# Patient Record
Sex: MALE | Race: WHITE | NOT HISPANIC OR LATINO | URBAN - METROPOLITAN AREA
[De-identification: names, ages, dates, MRNs, and addresses within clinical notes are randomized per-mention and may not be internally consistent; named-entity substitution may affect disease eponyms.]

---

## 2020-09-18 NOTE — PATIENT DISCUSSION
RECOMMEND TRANSCONJUNCTIVAL LOWER EYELID BLEPHAROPLASTY W/ CO2 LASER RESURFACING AND RESUSPENSION OF LATERAL ANGLES, OU

## 2020-09-18 NOTE — PATIENT DISCUSSION
PHOTOGRAPHS: I have reviewed the external ocular photographs of this patient which show the following: moderate dermatochalasis of both lower eyelids.

## 2022-07-11 ENCOUNTER — NEW PATIENT (OUTPATIENT)
Dept: URBAN - METROPOLITAN AREA CLINIC 49 | Facility: CLINIC | Age: 77
End: 2022-07-11

## 2022-07-11 DIAGNOSIS — H35.371: ICD-10-CM

## 2022-07-11 DIAGNOSIS — H25.13: ICD-10-CM

## 2022-07-11 DIAGNOSIS — E11.9: ICD-10-CM

## 2022-07-11 DIAGNOSIS — H35.363: ICD-10-CM

## 2022-07-11 DIAGNOSIS — H04.123: ICD-10-CM

## 2022-07-11 PROCEDURE — 92004 COMPRE OPH EXAM NEW PT 1/>: CPT

## 2022-07-11 PROCEDURE — 92015 DETERMINE REFRACTIVE STATE: CPT

## 2022-07-11 PROCEDURE — 92134 CPTRZ OPH DX IMG PST SGM RTA: CPT

## 2022-07-11 ASSESSMENT — VISUAL ACUITY
OU_CC: J2@23"
OS_PH: 20/40
OS_CC: 20/80+2
OD_CC: 20/30+2
OD_GLARE: 20/70
OD_GLARE: 20/50
OU_CC: J3@16"

## 2022-07-11 ASSESSMENT — TONOMETRY
OD_IOP_MMHG: 12
OS_IOP_MMHG: 13

## 2022-07-29 ENCOUNTER — PRE-OP/H&P (OUTPATIENT)
Dept: URBAN - METROPOLITAN AREA CLINIC 49 | Facility: CLINIC | Age: 77
End: 2022-07-29

## 2022-07-29 DIAGNOSIS — H25.13: ICD-10-CM

## 2022-07-29 PROCEDURE — 92136 OPHTHALMIC BIOMETRY: CPT

## 2022-07-29 PROCEDURE — 92025-3 CORNEAL TOPO, REFUSED

## 2022-07-29 PROCEDURE — PREOP PRE OP VISIT

## 2022-07-29 ASSESSMENT — KERATOMETRY
OD_K2POWER_DIOPTERS: 44.12
OS_K2POWER_DIOPTERS: 44.00
OD_AXISANGLE2_DEGREES: 176
OS_AXISANGLE_DEGREES: 045
OD_AXISANGLE_DEGREES: 086
OS_AXISANGLE2_DEGREES: 135
OS_K1POWER_DIOPTERS: 44.75
OD_K1POWER_DIOPTERS: 45.12

## 2022-07-29 ASSESSMENT — TONOMETRY
OD_IOP_MMHG: 14
OS_IOP_MMHG: 14

## 2022-07-29 ASSESSMENT — VISUAL ACUITY
OD_CC: 20/40-1
OS_PH: 20/50-2
OS_CC: 20/200

## 2022-08-10 ENCOUNTER — SURGERY/PROCEDURE (OUTPATIENT)
Dept: URBAN - METROPOLITAN AREA SURGERY 16 | Facility: SURGERY | Age: 77
End: 2022-08-10

## 2022-08-10 DIAGNOSIS — H25.12: ICD-10-CM

## 2022-08-10 PROBLEM — Z96.1: Noted: 2022-08-10

## 2022-08-10 PROCEDURE — 66984 XCAPSL CTRC RMVL W/O ECP: CPT

## 2022-08-10 ASSESSMENT — KERATOMETRY
OD_K1POWER_DIOPTERS: 45.12
OS_K1POWER_DIOPTERS: 44.75
OD_K2POWER_DIOPTERS: 44.12
OS_AXISANGLE_DEGREES: 045
OS_K2POWER_DIOPTERS: 44.00
OS_AXISANGLE2_DEGREES: 135
OD_AXISANGLE_DEGREES: 086
OD_AXISANGLE2_DEGREES: 176

## 2022-08-11 ENCOUNTER — POST-OP (OUTPATIENT)
Dept: URBAN - METROPOLITAN AREA CLINIC 49 | Facility: CLINIC | Age: 77
End: 2022-08-11

## 2022-08-11 DIAGNOSIS — Z98.42: ICD-10-CM

## 2022-08-11 DIAGNOSIS — Z96.1: ICD-10-CM

## 2022-08-11 ASSESSMENT — KERATOMETRY
OD_K1POWER_DIOPTERS: 45.12
OS_AXISANGLE_DEGREES: 045
OS_AXISANGLE2_DEGREES: 135
OD_AXISANGLE2_DEGREES: 176
OS_K2POWER_DIOPTERS: 44.00
OS_K1POWER_DIOPTERS: 44.75
OD_AXISANGLE_DEGREES: 086
OD_K2POWER_DIOPTERS: 44.12

## 2022-08-11 ASSESSMENT — VISUAL ACUITY: OS_SC: 20/80

## 2022-08-11 ASSESSMENT — TONOMETRY: OS_IOP_MMHG: 18

## 2022-08-19 ENCOUNTER — POST OP/EVAL OF SECOND EYE (OUTPATIENT)
Dept: URBAN - METROPOLITAN AREA CLINIC 49 | Facility: CLINIC | Age: 77
End: 2022-08-19

## 2022-08-19 DIAGNOSIS — Z98.42: ICD-10-CM

## 2022-08-19 DIAGNOSIS — H25.11: ICD-10-CM

## 2022-08-19 PROCEDURE — 99213 OFFICE O/P EST LOW 20 MIN: CPT

## 2022-08-19 PROCEDURE — 92136 - 2N OPHTHALMIC BIOMETRY BY PARTIAL COHERENCE INTERFEROMETRY WITH INTRAOCULAR LENS POWER CALCULATION

## 2022-08-19 ASSESSMENT — KERATOMETRY
OD_K2POWER_DIOPTERS: 44.12
OS_AXISANGLE_DEGREES: 045
OD_AXISANGLE2_DEGREES: 176
OD_AXISANGLE_DEGREES: 086
OS_AXISANGLE2_DEGREES: 135
OS_K2POWER_DIOPTERS: 44.00
OD_K1POWER_DIOPTERS: 45.12
OS_K1POWER_DIOPTERS: 44.75

## 2022-08-19 ASSESSMENT — TONOMETRY
OS_IOP_MMHG: 14
OD_IOP_MMHG: 14

## 2022-08-19 ASSESSMENT — VISUAL ACUITY
OS_SC: 20/50-1
OD_CC: 20/30

## 2022-08-31 ENCOUNTER — SURGERY/PROCEDURE (OUTPATIENT)
Dept: URBAN - METROPOLITAN AREA SURGERY 16 | Facility: SURGERY | Age: 77
End: 2022-08-31

## 2022-08-31 DIAGNOSIS — H25.11: ICD-10-CM

## 2022-08-31 PROCEDURE — 66984 XCAPSL CTRC RMVL W/O ECP: CPT

## 2022-08-31 ASSESSMENT — KERATOMETRY
OS_AXISANGLE2_DEGREES: 135
OS_K2POWER_DIOPTERS: 44.00
OD_K2POWER_DIOPTERS: 44.12
OD_AXISANGLE2_DEGREES: 176
OD_AXISANGLE_DEGREES: 086
OD_K1POWER_DIOPTERS: 45.12
OS_AXISANGLE_DEGREES: 045
OS_K1POWER_DIOPTERS: 44.75

## 2022-09-01 ENCOUNTER — POST-OP (OUTPATIENT)
Dept: URBAN - METROPOLITAN AREA CLINIC 49 | Facility: CLINIC | Age: 77
End: 2022-09-01

## 2022-09-01 DIAGNOSIS — Z98.41: ICD-10-CM

## 2022-09-01 DIAGNOSIS — Z96.1: ICD-10-CM

## 2022-09-01 ASSESSMENT — KERATOMETRY
OD_K1POWER_DIOPTERS: 45.12
OD_AXISANGLE2_DEGREES: 176
OS_K2POWER_DIOPTERS: 44.00
OS_AXISANGLE_DEGREES: 045
OS_K1POWER_DIOPTERS: 44.75
OS_AXISANGLE2_DEGREES: 135
OD_K2POWER_DIOPTERS: 44.12
OD_AXISANGLE_DEGREES: 086

## 2022-09-01 ASSESSMENT — TONOMETRY: OD_IOP_MMHG: 16

## 2022-09-01 ASSESSMENT — VISUAL ACUITY: OD_SC: 20/40

## 2022-09-06 ENCOUNTER — POST-OP (OUTPATIENT)
Dept: URBAN - METROPOLITAN AREA CLINIC 50 | Facility: CLINIC | Age: 77
End: 2022-09-06

## 2022-09-06 DIAGNOSIS — Z96.1: ICD-10-CM

## 2022-09-06 DIAGNOSIS — Z98.41: ICD-10-CM

## 2022-09-06 PROCEDURE — 99024 POSTOP FOLLOW-UP VISIT: CPT

## 2022-09-06 ASSESSMENT — VISUAL ACUITY
OS_SC: 20/50-1
OD_PH: 20/25-1
OS_PH: 20/30-1
OD_SC: 20/30+2

## 2022-09-06 ASSESSMENT — TONOMETRY
OD_IOP_MMHG: 14
OS_IOP_MMHG: 14

## 2022-09-30 ENCOUNTER — POST-OP (OUTPATIENT)
Dept: URBAN - METROPOLITAN AREA CLINIC 49 | Facility: CLINIC | Age: 77
End: 2022-09-30

## 2022-09-30 DIAGNOSIS — Z98.42: ICD-10-CM

## 2022-09-30 DIAGNOSIS — Z98.41: ICD-10-CM

## 2022-09-30 PROCEDURE — 92015 DETERMINE REFRACTIVE STATE: CPT

## 2022-09-30 PROCEDURE — 99024 POSTOP FOLLOW-UP VISIT: CPT

## 2022-09-30 ASSESSMENT — VISUAL ACUITY
OD_SC: 20/25-1
OS_SC: 20/40-1
OS_PH: 20/25-1

## 2022-09-30 ASSESSMENT — TONOMETRY
OD_IOP_MMHG: 14
OS_IOP_MMHG: 14

## 2023-07-24 ENCOUNTER — EMERGENCY VISIT (OUTPATIENT)
Dept: URBAN - METROPOLITAN AREA CLINIC 53 | Facility: CLINIC | Age: 78
End: 2023-07-24

## 2023-07-24 DIAGNOSIS — H00.024: ICD-10-CM

## 2023-07-24 PROCEDURE — 92012 INTRM OPH EXAM EST PATIENT: CPT

## 2023-07-24 RX ORDER — CEPHALEXIN 500 MG/1: 1 CAPSULE ORAL TWICE A DAY

## 2023-07-24 ASSESSMENT — VISUAL ACUITY
OD_SC: 20/25
OS_SC: 20/40
OS_PH: 20/25

## 2023-07-24 ASSESSMENT — TONOMETRY
OS_IOP_MMHG: 14
OD_IOP_MMHG: 13

## 2023-10-05 ENCOUNTER — COMPREHENSIVE EXAM (OUTPATIENT)
Dept: URBAN - METROPOLITAN AREA CLINIC 49 | Facility: LOCATION | Age: 78
End: 2023-10-05

## 2023-10-05 DIAGNOSIS — H26.493: ICD-10-CM

## 2023-10-05 DIAGNOSIS — E11.9: ICD-10-CM

## 2023-10-05 DIAGNOSIS — H52.4: ICD-10-CM

## 2023-10-05 DIAGNOSIS — H35.363: ICD-10-CM

## 2023-10-05 DIAGNOSIS — H04.123: ICD-10-CM

## 2023-10-05 PROCEDURE — 92015 DETERMINE REFRACTIVE STATE: CPT

## 2023-10-05 PROCEDURE — 92014 COMPRE OPH EXAM EST PT 1/>: CPT

## 2023-10-05 PROCEDURE — 92134 CPTRZ OPH DX IMG PST SGM RTA: CPT

## 2023-10-05 ASSESSMENT — VISUAL ACUITY
OU_SC: 20/25
OD_GLARE: 20/20
OS_GLARE: 20/30
OS_PH: 20/30-1
OS_GLARE: 20/25
OD_GLARE: 20/25
OS_SC: 20/50
OD_SC: 20/25-2

## 2023-10-05 ASSESSMENT — KERATOMETRY
OD_K2POWER_DIOPTERS: 45.50
OD_K1POWER_DIOPTERS: 44.00
OD_AXISANGLE_DEGREES: 178
OS_AXISANGLE2_DEGREES: 30
OD_AXISANGLE2_DEGREES: 88
OS_AXISANGLE_DEGREES: 120
OS_K2POWER_DIOPTERS: 45.25
OS_K1POWER_DIOPTERS: 44.75

## 2023-10-05 ASSESSMENT — TONOMETRY
OD_IOP_MMHG: 12
OS_IOP_MMHG: 13

## 2025-04-22 ENCOUNTER — COMPREHENSIVE EXAM (OUTPATIENT)
Age: 80
End: 2025-04-22

## 2025-04-22 DIAGNOSIS — H35.363: ICD-10-CM

## 2025-04-22 DIAGNOSIS — H35.371: ICD-10-CM

## 2025-04-22 DIAGNOSIS — H04.123: ICD-10-CM

## 2025-04-22 DIAGNOSIS — H26.493: ICD-10-CM

## 2025-04-22 DIAGNOSIS — E11.9: ICD-10-CM

## 2025-04-22 PROCEDURE — 99214 OFFICE O/P EST MOD 30 MIN: CPT

## 2025-04-22 PROCEDURE — 92134 CPTRZ OPH DX IMG PST SGM RTA: CPT
